# Patient Record
(demographics unavailable — no encounter records)

---

## 2025-01-02 NOTE — REVIEW OF SYSTEMS
[Palpitations] : palpitations [Joint Pain] : joint pain [Negative] : Heme/Lymph [Dyspnea on exertion] : not dyspnea during exertion [Lower Ext Edema] : no extremity edema [Orthopnea] : no orthopnea [PND] : no PND [Syncope] : no syncope [Dizziness] : no dizziness [FreeTextEntry9] : see HPI

## 2025-01-02 NOTE — ASSESSMENT
[FreeTextEntry1] : Dania Ken is a 57-year-old woman with:  1) Longstanding palpitations and previously documented long-RP tachycardia 2) Hyperlipidemia 3) Right shoulder pain - receiving injection today at [facility].   - We discussed the pathophysiology of SVT.  As in the past, we discussed EP study and possible ablation including the benefits, risks, alternatives and limitations.  She remains hesitant to pursue ablation.  - Start metoprolol succinate 12.5mg nightly.  - We discussed she can continue to use metoprolol tartrate 25mg daily as needed for palpitations.

## 2025-01-02 NOTE — HISTORY OF PRESENT ILLNESS
[FreeTextEntry1] : Dania Llanes is a very pleasant 57-year-old woman with a long history of palpitations and paroxysmal SVT.  She was seen previously at [facility] where extended monitoring showed frequent episodes of long-RP tachycardia.  She has been managed with metoprolol tartrate 25mg daily as needed for palpitations, having declined EPS / ablation.  She notes near daily palpitations.   She notes an ongoing Right shoulder issue for which she is receiving joint injections.   ECG today: Normal sinus rhythm 60bpm  MIGUEL in 2017 showed possible malrotation with normal biventricular function and no significant valvular disease.

## 2025-01-02 NOTE — PHYSICAL EXAM
[Well Developed] : well developed [Well Nourished] : well nourished [No Acute Distress] : no acute distress [Normal] : normal conjunctiva [Normal Venous Pressure] : normal venous pressure [No Carotid Bruit] : no carotid bruit [Normal S1, S2] : normal S1, S2 [No Murmur] : no murmur [No Rub] : no rub [No Gallop] : no gallop [Clear Lung Fields] : clear lung fields [Good Air Entry] : good air entry [No Respiratory Distress] : no respiratory distress  [Soft] : abdomen soft [Non Tender] : non-tender [No Masses/organomegaly] : no masses/organomegaly [Normal Bowel Sounds] : normal bowel sounds [Normal Gait] : normal gait [No Edema] : no edema [No Cyanosis] : no cyanosis [No Clubbing] : no clubbing [No Rash] : no rash [Moves all extremities] : moves all extremities [No Focal Deficits] : no focal deficits [Normal Speech] : normal speech [Alert and Oriented] : alert and oriented [Normal memory] : normal memory

## 2025-01-02 NOTE — CARDIOLOGY SUMMARY
[de-identified] : 1/2/25  NSR 60bpm [de-identified] : MIGUEL 2017 : malrotation, normal biventricular function